# Patient Record
Sex: MALE | Race: WHITE | Employment: UNEMPLOYED | URBAN - METROPOLITAN AREA
[De-identification: names, ages, dates, MRNs, and addresses within clinical notes are randomized per-mention and may not be internally consistent; named-entity substitution may affect disease eponyms.]

---

## 2019-03-18 RX ORDER — OMEPRAZOLE 40 MG/1
40 CAPSULE, DELAYED RELEASE ORAL DAILY
COMMUNITY

## 2019-03-18 RX ORDER — HYDROCODONE BITARTRATE AND ACETAMINOPHEN 5; 325 MG/1; MG/1
1 TABLET ORAL
Status: ON HOLD | COMMUNITY
End: 2019-03-22 | Stop reason: SDUPTHER

## 2019-03-18 RX ORDER — ATORVASTATIN CALCIUM 40 MG/1
TABLET, FILM COATED ORAL DAILY
COMMUNITY

## 2019-03-18 RX ORDER — MELATONIN 5 MG
5 CAPSULE ORAL
COMMUNITY

## 2019-03-18 RX ORDER — CARBAMAZEPINE 400 MG/1
400 TABLET, EXTENDED RELEASE ORAL 2 TIMES DAILY
COMMUNITY

## 2019-03-18 RX ORDER — FLUOXETINE HYDROCHLORIDE 40 MG/1
40 CAPSULE ORAL DAILY
COMMUNITY

## 2019-03-18 RX ORDER — CARBAMAZEPINE 400 MG/1
400 TABLET, EXTENDED RELEASE ORAL 2 TIMES DAILY
COMMUNITY
End: 2021-09-15 | Stop reason: SDUPTHER

## 2019-03-18 RX ORDER — CALCIUM POLYCARBOPHIL 625 MG
625 TABLET ORAL EVERY EVENING
COMMUNITY

## 2019-03-18 RX ORDER — CETIRIZINE HCL 10 MG
TABLET ORAL DAILY
COMMUNITY

## 2019-03-18 RX ORDER — MULTIVIT WITH IRON,MINERALS
2 TABLET ORAL 2 TIMES DAILY
COMMUNITY
End: 2021-09-15

## 2019-03-18 NOTE — PERIOP NOTES
PAT PREOP PHONE INTERVIEW COMPLETED WITH: RYLEE HOOVER, BIRTHMOTHER. MEDICATIONS AND PREOP INSTRUCTIONS REVIEWED AND FAXED TO VEGA FENG, CAREGIVER AT [facility]. PATIENT'S PARENTS WILL BE PRESENT DOS FOR SIGNING OF CONSENT. CAREGIVER ADVISED: PATIENT NOT TO EAT/DRINK ANYTHING PAST MIDNIGHT EVENING PRIOR TO SURGERY,  NOTHING TO EAT/DRINK MORNING OF SURGERY UNLESS SIP OF WATER TO SWALLOW MEDICATION;  LEAVE ALL VALUABLES AT HOME; DO BRING PICTURE ID, INSURANCE CARD AND ANY COPAY; WEAR COMFORTABLE CLOTHING;  NO PERFUMES, POWDERS, LOTIONS; NO ALCOHOL 24 HOURS BEFORE OR AFTER SURGERY;  WILL NEED TO BE DRIVEN HOME BY FAMILY OR FRIEND;  AVOID TAKING NSAIDS, ASPIRIN, FISH OIL, VITAMIN E OR GLUCOSAMINE/CHONDROITIN DURING THIS TIME PRIOR TO SURGERY;  MAY TAKE TYLENOL. INSTRUCTED TO REPORT TO [address]

## 2019-03-20 ENCOUNTER — ANESTHESIA EVENT (OUTPATIENT)
Dept: SURGERY | Age: 39
End: 2019-03-20
Payer: MEDICARE

## 2019-03-21 ENCOUNTER — HOSPITAL ENCOUNTER (OUTPATIENT)
Age: 39
Setting detail: OBSERVATION
Discharge: HOME | End: 2019-03-22
Attending: ORTHOPAEDIC SURGERY | Admitting: ORTHOPAEDIC SURGERY
Payer: MEDICARE

## 2019-03-21 ENCOUNTER — ANESTHESIA (OUTPATIENT)
Dept: SURGERY | Age: 39
End: 2019-03-21
Payer: MEDICARE

## 2019-03-21 ENCOUNTER — APPOINTMENT (OUTPATIENT)
Dept: GENERAL RADIOLOGY | Age: 39
End: 2019-03-21
Attending: ORTHOPAEDIC SURGERY
Payer: MEDICARE

## 2019-03-21 DIAGNOSIS — S42.022D CLOSED DISPLACED FRACTURE OF SHAFT OF LEFT CLAVICLE WITH ROUTINE HEALING, SUBSEQUENT ENCOUNTER: Primary | ICD-10-CM

## 2019-03-21 PROBLEM — S42.002A CLOSED DISPLACED FRACTURE OF LEFT CLAVICLE: Status: ACTIVE | Noted: 2019-03-21

## 2019-03-21 PROBLEM — Z87.81: Status: ACTIVE | Noted: 2019-03-21

## 2019-03-21 PROBLEM — F84.0 AUTISM SPECTRUM DISORDER: Status: ACTIVE | Noted: 2019-03-21

## 2019-03-21 LAB
ALBUMIN SERPL-MCNC: 4.1 G/DL (ref 3.5–5)
ALBUMIN/GLOB SERPL: 1 {RATIO} (ref 1.1–2.2)
ALP SERPL-CCNC: 286 U/L (ref 45–117)
ALT SERPL-CCNC: 46 U/L (ref 12–78)
ANION GAP SERPL CALC-SCNC: 6 MMOL/L (ref 5–15)
AST SERPL-CCNC: 28 U/L (ref 15–37)
BASOPHILS # BLD: 0 K/UL (ref 0–0.1)
BASOPHILS NFR BLD: 0 % (ref 0–1)
BILIRUB SERPL-MCNC: 0.3 MG/DL (ref 0.2–1)
BUN SERPL-MCNC: 15 MG/DL (ref 6–20)
BUN/CREAT SERPL: 20 (ref 12–20)
CALCIUM SERPL-MCNC: 9.2 MG/DL (ref 8.5–10.1)
CARBAMAZEPINE SERPL-MCNC: 10.8 UG/ML (ref 4–12)
CHLORIDE SERPL-SCNC: 104 MMOL/L (ref 97–108)
CHOLEST SERPL-MCNC: 181 MG/DL
CO2 SERPL-SCNC: 27 MMOL/L (ref 21–32)
CREAT SERPL-MCNC: 0.74 MG/DL (ref 0.7–1.3)
DIFFERENTIAL METHOD BLD: ABNORMAL
EOSINOPHIL # BLD: 0.1 K/UL (ref 0–0.4)
EOSINOPHIL NFR BLD: 2 % (ref 0–7)
ERYTHROCYTE [DISTWIDTH] IN BLOOD BY AUTOMATED COUNT: 11.9 % (ref 11.5–14.5)
GLOBULIN SER CALC-MCNC: 4 G/DL (ref 2–4)
GLUCOSE SERPL-MCNC: 105 MG/DL (ref 65–100)
HCT VFR BLD AUTO: 41.3 % (ref 36.6–50.3)
HDLC SERPL-MCNC: 49 MG/DL
HDLC SERPL: 3.7 {RATIO} (ref 0–5)
HGB BLD-MCNC: 14.4 G/DL (ref 12.1–17)
IMM GRANULOCYTES # BLD AUTO: 0 K/UL (ref 0–0.04)
IMM GRANULOCYTES NFR BLD AUTO: 0 % (ref 0–0.5)
LDLC SERPL CALC-MCNC: 108.6 MG/DL (ref 0–100)
LIPID PROFILE,FLP: ABNORMAL
LYMPHOCYTES # BLD: 1.4 K/UL (ref 0.8–3.5)
LYMPHOCYTES NFR BLD: 18 % (ref 12–49)
MCH RBC QN AUTO: 33.3 PG (ref 26–34)
MCHC RBC AUTO-ENTMCNC: 34.9 G/DL (ref 30–36.5)
MCV RBC AUTO: 95.4 FL (ref 80–99)
MONOCYTES # BLD: 0.7 K/UL (ref 0–1)
MONOCYTES NFR BLD: 9 % (ref 5–13)
NEUTS SEG # BLD: 5.7 K/UL (ref 1.8–8)
NEUTS SEG NFR BLD: 71 % (ref 32–75)
NRBC # BLD: 0 K/UL (ref 0–0.01)
NRBC BLD-RTO: 0 PER 100 WBC
PLATELET # BLD AUTO: 428 K/UL (ref 150–400)
PMV BLD AUTO: 8.7 FL (ref 8.9–12.9)
POTASSIUM SERPL-SCNC: 3.6 MMOL/L (ref 3.5–5.1)
PROT SERPL-MCNC: 8.1 G/DL (ref 6.4–8.2)
RBC # BLD AUTO: 4.33 M/UL (ref 4.1–5.7)
SODIUM SERPL-SCNC: 137 MMOL/L (ref 136–145)
TRIGL SERPL-MCNC: 117 MG/DL (ref ?–150)
VLDLC SERPL CALC-MCNC: 23.4 MG/DL
WBC # BLD AUTO: 7.9 K/UL (ref 4.1–11.1)

## 2019-03-21 PROCEDURE — 77030011640 HC PAD GRND REM COVD -A: Performed by: ORTHOPAEDIC SURGERY

## 2019-03-21 PROCEDURE — C1713 ANCHOR/SCREW BN/BN,TIS/BN: HCPCS | Performed by: ORTHOPAEDIC SURGERY

## 2019-03-21 PROCEDURE — 73000 X-RAY EXAM OF COLLAR BONE: CPT

## 2019-03-21 PROCEDURE — 77030008684 HC TU ET CUF COVD -B: Performed by: ANESTHESIOLOGY

## 2019-03-21 PROCEDURE — 74011250636 HC RX REV CODE- 250/636: Performed by: ORTHOPAEDIC SURGERY

## 2019-03-21 PROCEDURE — 77030033269 HC SLV COMPR SCD KNE2 CARD -B

## 2019-03-21 PROCEDURE — 76210000006 HC OR PH I REC 0.5 TO 1 HR: Performed by: ORTHOPAEDIC SURGERY

## 2019-03-21 PROCEDURE — 76010000153 HC OR TIME 1.5 TO 2 HR: Performed by: ORTHOPAEDIC SURGERY

## 2019-03-21 PROCEDURE — 74011250637 HC RX REV CODE- 250/637: Performed by: ORTHOPAEDIC SURGERY

## 2019-03-21 PROCEDURE — 77030018836 HC SOL IRR NACL ICUM -A: Performed by: ORTHOPAEDIC SURGERY

## 2019-03-21 PROCEDURE — 77030031139 HC SUT VCRL2 J&J -A: Performed by: ORTHOPAEDIC SURGERY

## 2019-03-21 PROCEDURE — 74011000250 HC RX REV CODE- 250

## 2019-03-21 PROCEDURE — 74011000250 HC RX REV CODE- 250: Performed by: ORTHOPAEDIC SURGERY

## 2019-03-21 PROCEDURE — 74011250636 HC RX REV CODE- 250/636: Performed by: ANESTHESIOLOGY

## 2019-03-21 PROCEDURE — 77030019908 HC STETH ESOPH SIMS -A: Performed by: ANESTHESIOLOGY

## 2019-03-21 PROCEDURE — 85025 COMPLETE CBC W/AUTO DIFF WBC: CPT

## 2019-03-21 PROCEDURE — 76060000035 HC ANESTHESIA 2 TO 2.5 HR: Performed by: ORTHOPAEDIC SURGERY

## 2019-03-21 PROCEDURE — 77030013079 HC BLNKT BAIR HGGR 3M -A: Performed by: ANESTHESIOLOGY

## 2019-03-21 PROCEDURE — 74011250636 HC RX REV CODE- 250/636

## 2019-03-21 PROCEDURE — 80061 LIPID PANEL: CPT

## 2019-03-21 PROCEDURE — 76000 FLUOROSCOPY <1 HR PHYS/QHP: CPT

## 2019-03-21 PROCEDURE — 36415 COLL VENOUS BLD VENIPUNCTURE: CPT

## 2019-03-21 PROCEDURE — 99218 HC RM OBSERVATION: CPT

## 2019-03-21 PROCEDURE — 77030026438 HC STYL ET INTUB CARD -A: Performed by: ANESTHESIOLOGY

## 2019-03-21 PROCEDURE — 80053 COMPREHEN METABOLIC PANEL: CPT

## 2019-03-21 PROCEDURE — 80156 ASSAY CARBAMAZEPINE TOTAL: CPT

## 2019-03-21 PROCEDURE — 77030003737 HC BIT DRL ACMD -C: Performed by: ORTHOPAEDIC SURGERY

## 2019-03-21 DEVICE — 3.5MM X 14MM NON-LOCKING HEXALOBE SCREW
Type: IMPLANTABLE DEVICE | Site: CLAVICLE | Status: FUNCTIONAL
Brand: ACUMED

## 2019-03-21 DEVICE — 3.5MM X 12MM LOCKING HEXALOBE SCREW
Type: IMPLANTABLE DEVICE | Site: CLAVICLE | Status: FUNCTIONAL
Brand: ACUMED

## 2019-03-21 DEVICE — 3.5MM X 18MM NON-LOCKING HEXALOBE SCREW
Type: IMPLANTABLE DEVICE | Site: CLAVICLE | Status: FUNCTIONAL
Brand: ACUMED

## 2019-03-21 DEVICE — 3.5MM X 10MM NON-LOCKING HEXALOBE SCREW
Type: IMPLANTABLE DEVICE | Site: CLAVICLE | Status: FUNCTIONAL
Brand: ACUMED

## 2019-03-21 RX ORDER — MORPHINE SULFATE 4 MG/ML
INJECTION, SOLUTION INTRAMUSCULAR; INTRAVENOUS AS NEEDED
Status: DISCONTINUED | OUTPATIENT
Start: 2019-03-21 | End: 2019-03-21 | Stop reason: HOSPADM

## 2019-03-21 RX ORDER — MIDAZOLAM HYDROCHLORIDE 1 MG/ML
INJECTION, SOLUTION INTRAMUSCULAR; INTRAVENOUS
Status: COMPLETED
Start: 2019-03-21 | End: 2019-03-21

## 2019-03-21 RX ORDER — OMEPRAZOLE 40 MG/1
40 CAPSULE, DELAYED RELEASE ORAL DAILY
Status: DISCONTINUED | OUTPATIENT
Start: 2019-03-22 | End: 2019-03-21 | Stop reason: CLARIF

## 2019-03-21 RX ORDER — CARBAMAZEPINE 200 MG/1
400 TABLET, EXTENDED RELEASE ORAL 2 TIMES DAILY
Status: DISCONTINUED | OUTPATIENT
Start: 2019-03-21 | End: 2019-03-22 | Stop reason: HOSPADM

## 2019-03-21 RX ORDER — BUPIVACAINE HYDROCHLORIDE AND EPINEPHRINE 5; 5 MG/ML; UG/ML
INJECTION, SOLUTION EPIDURAL; INTRACAUDAL; PERINEURAL AS NEEDED
Status: DISCONTINUED | OUTPATIENT
Start: 2019-03-21 | End: 2019-03-21 | Stop reason: HOSPADM

## 2019-03-21 RX ORDER — DEXTROSE, SODIUM CHLORIDE, AND POTASSIUM CHLORIDE 5; .45; .15 G/100ML; G/100ML; G/100ML
INJECTION INTRAVENOUS
Status: DISCONTINUED
Start: 2019-03-21 | End: 2019-03-21

## 2019-03-21 RX ORDER — SODIUM CHLORIDE, SODIUM LACTATE, POTASSIUM CHLORIDE, CALCIUM CHLORIDE 600; 310; 30; 20 MG/100ML; MG/100ML; MG/100ML; MG/100ML
125 INJECTION, SOLUTION INTRAVENOUS CONTINUOUS
Status: DISCONTINUED | OUTPATIENT
Start: 2019-03-21 | End: 2019-03-21 | Stop reason: HOSPADM

## 2019-03-21 RX ORDER — ONDANSETRON 2 MG/ML
INJECTION INTRAMUSCULAR; INTRAVENOUS AS NEEDED
Status: DISCONTINUED | OUTPATIENT
Start: 2019-03-21 | End: 2019-03-21 | Stop reason: HOSPADM

## 2019-03-21 RX ORDER — LANOLIN ALCOHOL/MO/W.PET/CERES
6 CREAM (GRAM) TOPICAL
Status: DISCONTINUED | OUTPATIENT
Start: 2019-03-21 | End: 2019-03-22 | Stop reason: HOSPADM

## 2019-03-21 RX ORDER — ACETAMINOPHEN 10 MG/ML
INJECTION, SOLUTION INTRAVENOUS AS NEEDED
Status: DISCONTINUED | OUTPATIENT
Start: 2019-03-21 | End: 2019-03-21 | Stop reason: HOSPADM

## 2019-03-21 RX ORDER — CALCIUM POLYCARBOPHIL 625 MG
1 TABLET ORAL EVERY EVENING
Status: DISCONTINUED | OUTPATIENT
Start: 2019-03-21 | End: 2019-03-22 | Stop reason: HOSPADM

## 2019-03-21 RX ORDER — FENTANYL CITRATE 50 UG/ML
50 INJECTION, SOLUTION INTRAMUSCULAR; INTRAVENOUS AS NEEDED
Status: DISCONTINUED | OUTPATIENT
Start: 2019-03-21 | End: 2019-03-21 | Stop reason: HOSPADM

## 2019-03-21 RX ORDER — HYDROMORPHONE HYDROCHLORIDE 1 MG/ML
0.2 INJECTION, SOLUTION INTRAMUSCULAR; INTRAVENOUS; SUBCUTANEOUS
Status: DISCONTINUED | OUTPATIENT
Start: 2019-03-21 | End: 2019-03-21 | Stop reason: HOSPADM

## 2019-03-21 RX ORDER — PROPOFOL 10 MG/ML
INJECTION, EMULSION INTRAVENOUS AS NEEDED
Status: DISCONTINUED | OUTPATIENT
Start: 2019-03-21 | End: 2019-03-21 | Stop reason: HOSPADM

## 2019-03-21 RX ORDER — CEFAZOLIN SODIUM 1 G/3ML
INJECTION, POWDER, FOR SOLUTION INTRAMUSCULAR; INTRAVENOUS AS NEEDED
Status: DISCONTINUED | OUTPATIENT
Start: 2019-03-21 | End: 2019-03-21 | Stop reason: HOSPADM

## 2019-03-21 RX ORDER — LIDOCAINE HYDROCHLORIDE 10 MG/ML
0.1 INJECTION, SOLUTION EPIDURAL; INFILTRATION; INTRACAUDAL; PERINEURAL AS NEEDED
Status: DISCONTINUED | OUTPATIENT
Start: 2019-03-21 | End: 2019-03-21 | Stop reason: HOSPADM

## 2019-03-21 RX ORDER — SODIUM CHLORIDE 0.9 % (FLUSH) 0.9 %
5-40 SYRINGE (ML) INJECTION EVERY 8 HOURS
Status: DISCONTINUED | OUTPATIENT
Start: 2019-03-21 | End: 2019-03-21 | Stop reason: HOSPADM

## 2019-03-21 RX ORDER — ONDANSETRON 2 MG/ML
4 INJECTION INTRAMUSCULAR; INTRAVENOUS AS NEEDED
Status: DISCONTINUED | OUTPATIENT
Start: 2019-03-21 | End: 2019-03-21 | Stop reason: HOSPADM

## 2019-03-21 RX ORDER — MORPHINE SULFATE 10 MG/ML
2 INJECTION, SOLUTION INTRAMUSCULAR; INTRAVENOUS
Status: DISCONTINUED | OUTPATIENT
Start: 2019-03-21 | End: 2019-03-21 | Stop reason: HOSPADM

## 2019-03-21 RX ORDER — DIPHENHYDRAMINE HYDROCHLORIDE 50 MG/ML
12.5 INJECTION, SOLUTION INTRAMUSCULAR; INTRAVENOUS AS NEEDED
Status: DISCONTINUED | OUTPATIENT
Start: 2019-03-21 | End: 2019-03-21 | Stop reason: HOSPADM

## 2019-03-21 RX ORDER — LORAZEPAM 2 MG/ML
1 INJECTION INTRAMUSCULAR
Status: COMPLETED | OUTPATIENT
Start: 2019-03-21 | End: 2019-03-22

## 2019-03-21 RX ORDER — MIDAZOLAM HYDROCHLORIDE 1 MG/ML
1 INJECTION, SOLUTION INTRAMUSCULAR; INTRAVENOUS ONCE
Status: COMPLETED | OUTPATIENT
Start: 2019-03-21 | End: 2019-03-21

## 2019-03-21 RX ORDER — LIDOCAINE HYDROCHLORIDE 20 MG/ML
INJECTION, SOLUTION EPIDURAL; INFILTRATION; INTRACAUDAL; PERINEURAL AS NEEDED
Status: DISCONTINUED | OUTPATIENT
Start: 2019-03-21 | End: 2019-03-21 | Stop reason: HOSPADM

## 2019-03-21 RX ORDER — HYDROCODONE BITARTRATE AND ACETAMINOPHEN 5; 325 MG/1; MG/1
1 TABLET ORAL
Status: DISCONTINUED | OUTPATIENT
Start: 2019-03-21 | End: 2019-03-22

## 2019-03-21 RX ORDER — DEXMEDETOMIDINE HYDROCHLORIDE 100 UG/ML
INJECTION, SOLUTION INTRAVENOUS AS NEEDED
Status: DISCONTINUED | OUTPATIENT
Start: 2019-03-21 | End: 2019-03-21

## 2019-03-21 RX ORDER — ATORVASTATIN CALCIUM 10 MG/1
5 TABLET, FILM COATED ORAL DAILY
Status: DISCONTINUED | OUTPATIENT
Start: 2019-03-22 | End: 2019-03-22 | Stop reason: HOSPADM

## 2019-03-21 RX ORDER — MIDAZOLAM HYDROCHLORIDE 1 MG/ML
0.5 INJECTION, SOLUTION INTRAMUSCULAR; INTRAVENOUS
Status: COMPLETED | OUTPATIENT
Start: 2019-03-21 | End: 2019-03-21

## 2019-03-21 RX ORDER — SODIUM CHLORIDE, SODIUM LACTATE, POTASSIUM CHLORIDE, CALCIUM CHLORIDE 600; 310; 30; 20 MG/100ML; MG/100ML; MG/100ML; MG/100ML
INJECTION, SOLUTION INTRAVENOUS
Status: DISCONTINUED | OUTPATIENT
Start: 2019-03-21 | End: 2019-03-21 | Stop reason: HOSPADM

## 2019-03-21 RX ORDER — ONDANSETRON 2 MG/ML
4 INJECTION INTRAMUSCULAR; INTRAVENOUS
Status: DISCONTINUED | OUTPATIENT
Start: 2019-03-21 | End: 2019-03-22 | Stop reason: HOSPADM

## 2019-03-21 RX ORDER — CARBAMAZEPINE 100 MG/1
100 TABLET, EXTENDED RELEASE ORAL 2 TIMES DAILY
Status: DISCONTINUED | OUTPATIENT
Start: 2019-03-21 | End: 2019-03-22 | Stop reason: HOSPADM

## 2019-03-21 RX ORDER — DEXTROSE, SODIUM CHLORIDE, AND POTASSIUM CHLORIDE 5; .45; .15 G/100ML; G/100ML; G/100ML
50 INJECTION INTRAVENOUS CONTINUOUS
Status: DISPENSED | OUTPATIENT
Start: 2019-03-21 | End: 2019-03-22

## 2019-03-21 RX ORDER — GLYCOPYRROLATE 0.2 MG/ML
INJECTION INTRAMUSCULAR; INTRAVENOUS AS NEEDED
Status: DISCONTINUED | OUTPATIENT
Start: 2019-03-21 | End: 2019-03-21 | Stop reason: HOSPADM

## 2019-03-21 RX ORDER — SODIUM CHLORIDE 0.9 % (FLUSH) 0.9 %
5-40 SYRINGE (ML) INJECTION EVERY 8 HOURS
Status: DISCONTINUED | OUTPATIENT
Start: 2019-03-21 | End: 2019-03-22 | Stop reason: HOSPADM

## 2019-03-21 RX ORDER — PHENYLEPHRINE HCL IN 0.9% NACL 0.4MG/10ML
SYRINGE (ML) INTRAVENOUS AS NEEDED
Status: DISCONTINUED | OUTPATIENT
Start: 2019-03-21 | End: 2019-03-21 | Stop reason: HOSPADM

## 2019-03-21 RX ORDER — SODIUM CHLORIDE 9 MG/ML
25 INJECTION, SOLUTION INTRAVENOUS CONTINUOUS
Status: DISCONTINUED | OUTPATIENT
Start: 2019-03-21 | End: 2019-03-21 | Stop reason: HOSPADM

## 2019-03-21 RX ORDER — SUCCINYLCHOLINE CHLORIDE 20 MG/ML
INJECTION INTRAMUSCULAR; INTRAVENOUS AS NEEDED
Status: DISCONTINUED | OUTPATIENT
Start: 2019-03-21 | End: 2019-03-21 | Stop reason: HOSPADM

## 2019-03-21 RX ORDER — NEOMYCIN SULFATE, POLYMYXIN B SULFATE, BACITRACIN ZINC, HYDROCORTISONE 3.5; 10000; 400; 1 MG/G; [USP'U]/G; [USP'U]/G; MG/G
OINTMENT OPHTHALMIC 2 TIMES DAILY
Status: DISCONTINUED | OUTPATIENT
Start: 2019-03-21 | End: 2019-03-22 | Stop reason: HOSPADM

## 2019-03-21 RX ORDER — FENTANYL CITRATE 50 UG/ML
25 INJECTION, SOLUTION INTRAMUSCULAR; INTRAVENOUS
Status: COMPLETED | OUTPATIENT
Start: 2019-03-21 | End: 2019-03-21

## 2019-03-21 RX ORDER — DEXAMETHASONE SODIUM PHOSPHATE 4 MG/ML
INJECTION, SOLUTION INTRA-ARTICULAR; INTRALESIONAL; INTRAMUSCULAR; INTRAVENOUS; SOFT TISSUE AS NEEDED
Status: DISCONTINUED | OUTPATIENT
Start: 2019-03-21 | End: 2019-03-21 | Stop reason: HOSPADM

## 2019-03-21 RX ORDER — SODIUM CHLORIDE 0.9 % (FLUSH) 0.9 %
5-40 SYRINGE (ML) INJECTION AS NEEDED
Status: DISCONTINUED | OUTPATIENT
Start: 2019-03-21 | End: 2019-03-22 | Stop reason: HOSPADM

## 2019-03-21 RX ORDER — CEFAZOLIN SODIUM/WATER 2 G/20 ML
2 SYRINGE (ML) INTRAVENOUS EVERY 8 HOURS
Status: COMPLETED | OUTPATIENT
Start: 2019-03-21 | End: 2019-03-22

## 2019-03-21 RX ORDER — PANTOPRAZOLE SODIUM 40 MG/1
40 TABLET, DELAYED RELEASE ORAL
Status: DISCONTINUED | OUTPATIENT
Start: 2019-03-22 | End: 2019-03-22 | Stop reason: HOSPADM

## 2019-03-21 RX ORDER — OXYCODONE AND ACETAMINOPHEN 5; 325 MG/1; MG/1
1 TABLET ORAL AS NEEDED
Status: DISCONTINUED | OUTPATIENT
Start: 2019-03-21 | End: 2019-03-21 | Stop reason: HOSPADM

## 2019-03-21 RX ORDER — DEXMEDETOMIDINE HYDROCHLORIDE 4 UG/ML
INJECTION, SOLUTION INTRAVENOUS AS NEEDED
Status: DISCONTINUED | OUTPATIENT
Start: 2019-03-21 | End: 2019-03-21 | Stop reason: HOSPADM

## 2019-03-21 RX ORDER — SODIUM CHLORIDE 0.9 % (FLUSH) 0.9 %
5-40 SYRINGE (ML) INJECTION AS NEEDED
Status: DISCONTINUED | OUTPATIENT
Start: 2019-03-21 | End: 2019-03-21 | Stop reason: HOSPADM

## 2019-03-21 RX ORDER — KETAMINE HYDROCHLORIDE 10 MG/ML
INJECTION, SOLUTION INTRAMUSCULAR; INTRAVENOUS AS NEEDED
Status: DISCONTINUED | OUTPATIENT
Start: 2019-03-21 | End: 2019-03-21 | Stop reason: HOSPADM

## 2019-03-21 RX ORDER — ROCURONIUM BROMIDE 10 MG/ML
INJECTION, SOLUTION INTRAVENOUS AS NEEDED
Status: DISCONTINUED | OUTPATIENT
Start: 2019-03-21 | End: 2019-03-21 | Stop reason: HOSPADM

## 2019-03-21 RX ORDER — MIDAZOLAM HYDROCHLORIDE 1 MG/ML
1 INJECTION, SOLUTION INTRAMUSCULAR; INTRAVENOUS AS NEEDED
Status: DISCONTINUED | OUTPATIENT
Start: 2019-03-21 | End: 2019-03-21 | Stop reason: HOSPADM

## 2019-03-21 RX ADMIN — Medication 6 MG: at 22:34

## 2019-03-21 RX ADMIN — DEXMEDETOMIDINE HYDROCHLORIDE 4 MCG: 4 INJECTION, SOLUTION INTRAVENOUS at 09:10

## 2019-03-21 RX ADMIN — SODIUM CHLORIDE, SODIUM LACTATE, POTASSIUM CHLORIDE, CALCIUM CHLORIDE: 600; 310; 30; 20 INJECTION, SOLUTION INTRAVENOUS at 08:40

## 2019-03-21 RX ADMIN — DEXMEDETOMIDINE HYDROCHLORIDE 4 MCG: 4 INJECTION, SOLUTION INTRAVENOUS at 08:40

## 2019-03-21 RX ADMIN — SUCCINYLCHOLINE CHLORIDE 100 MG: 20 INJECTION INTRAMUSCULAR; INTRAVENOUS at 08:40

## 2019-03-21 RX ADMIN — MIDAZOLAM 0.5 MG: 1 INJECTION INTRAMUSCULAR; INTRAVENOUS at 10:45

## 2019-03-21 RX ADMIN — FENTANYL CITRATE 25 MCG: 50 INJECTION INTRAMUSCULAR; INTRAVENOUS at 10:55

## 2019-03-21 RX ADMIN — DEXMEDETOMIDINE HYDROCHLORIDE 4 MCG: 4 INJECTION, SOLUTION INTRAVENOUS at 09:04

## 2019-03-21 RX ADMIN — FENTANYL CITRATE 25 MCG: 50 INJECTION INTRAMUSCULAR; INTRAVENOUS at 10:40

## 2019-03-21 RX ADMIN — NEOMYCIN SULFATE AND POLYMYXIN B SULFATE, BACITRACIN ZINC AND HYDROCORTISONE: 3.5; 10000; 400; 1 OINTMENT OPHTHALMIC at 18:31

## 2019-03-21 RX ADMIN — MIDAZOLAM HYDROCHLORIDE 0.5 MG: 1 INJECTION, SOLUTION INTRAMUSCULAR; INTRAVENOUS at 12:15

## 2019-03-21 RX ADMIN — HYDROCODONE BITARTRATE AND ACETAMINOPHEN 1 TABLET: 5; 325 TABLET ORAL at 19:08

## 2019-03-21 RX ADMIN — FENTANYL CITRATE 25 MCG: 50 INJECTION INTRAMUSCULAR; INTRAVENOUS at 10:30

## 2019-03-21 RX ADMIN — MORPHINE SULFATE 2 MG: 4 INJECTION, SOLUTION INTRAMUSCULAR; INTRAVENOUS at 09:16

## 2019-03-21 RX ADMIN — FENTANYL CITRATE 25 MCG: 50 INJECTION INTRAMUSCULAR; INTRAVENOUS at 10:50

## 2019-03-21 RX ADMIN — DEXMEDETOMIDINE HYDROCHLORIDE 4 MCG: 4 INJECTION, SOLUTION INTRAVENOUS at 09:19

## 2019-03-21 RX ADMIN — ACETAMINOPHEN 1000 MG: 10 INJECTION, SOLUTION INTRAVENOUS at 09:33

## 2019-03-21 RX ADMIN — ROCURONIUM BROMIDE 5 MG: 10 INJECTION, SOLUTION INTRAVENOUS at 08:40

## 2019-03-21 RX ADMIN — PROPOFOL 80 MG: 10 INJECTION, EMULSION INTRAVENOUS at 08:40

## 2019-03-21 RX ADMIN — CALCIUM POLYCARBOPHIL 625 MG: 625 TABLET, FILM COATED ORAL at 18:28

## 2019-03-21 RX ADMIN — MIDAZOLAM 0.5 MG: 1 INJECTION INTRAMUSCULAR; INTRAVENOUS at 09:29

## 2019-03-21 RX ADMIN — LIDOCAINE HYDROCHLORIDE 40 MG: 20 INJECTION, SOLUTION EPIDURAL; INFILTRATION; INTRACAUDAL; PERINEURAL at 08:40

## 2019-03-21 RX ADMIN — GLYCOPYRROLATE 0.2 MG: 0.2 INJECTION INTRAMUSCULAR; INTRAVENOUS at 08:37

## 2019-03-21 RX ADMIN — MIDAZOLAM 0.5 MG: 1 INJECTION INTRAMUSCULAR; INTRAVENOUS at 10:55

## 2019-03-21 RX ADMIN — CARBAMAZEPINE 100 MG: 100 TABLET, EXTENDED RELEASE ORAL at 18:29

## 2019-03-21 RX ADMIN — CEFAZOLIN SODIUM 2 G: 1 INJECTION, POWDER, FOR SOLUTION INTRAMUSCULAR; INTRAVENOUS at 09:06

## 2019-03-21 RX ADMIN — ONDANSETRON 4 MG: 2 INJECTION INTRAMUSCULAR; INTRAVENOUS at 09:06

## 2019-03-21 RX ADMIN — Medication 2 G: at 18:31

## 2019-03-21 RX ADMIN — MORPHINE SULFATE 2 MG: 4 INJECTION, SOLUTION INTRAMUSCULAR; INTRAVENOUS at 10:26

## 2019-03-21 RX ADMIN — MIDAZOLAM HYDROCHLORIDE 0.5 MG: 1 INJECTION, SOLUTION INTRAMUSCULAR; INTRAVENOUS at 11:55

## 2019-03-21 RX ADMIN — CARBAMAZEPINE 400 MG: 200 TABLET, EXTENDED RELEASE ORAL at 18:29

## 2019-03-21 RX ADMIN — DEXAMETHASONE SODIUM PHOSPHATE 4 MG: 4 INJECTION, SOLUTION INTRA-ARTICULAR; INTRALESIONAL; INTRAMUSCULAR; INTRAVENOUS; SOFT TISSUE at 09:06

## 2019-03-21 RX ADMIN — MIDAZOLAM 0.5 MG: 1 INJECTION INTRAMUSCULAR; INTRAVENOUS at 11:55

## 2019-03-21 RX ADMIN — MIDAZOLAM 0.5 MG: 1 INJECTION INTRAMUSCULAR; INTRAVENOUS at 11:10

## 2019-03-21 RX ADMIN — KETAMINE HYDROCHLORIDE 200 MG: 10 INJECTION, SOLUTION INTRAMUSCULAR; INTRAVENOUS at 08:19

## 2019-03-21 RX ADMIN — Medication 80 MCG: at 09:01

## 2019-03-21 NOTE — PERIOP NOTES
Patient non verbal, pulling on lines and will not keep Oxygen on. Family requesting to have SCD taken off left leg as patient is getting agitated from SCD. Mothers says, he does not like anything touching him. Patient rocking back and force in bed.   Moaning on and off

## 2019-03-21 NOTE — PROGRESS NOTES
Bedside shift change report given to hodan (oncoming nurse) by [facility] (offgoing nurse).  Report included the following information SBAR, Kardex, OR Summary, Intake/Output, MAR and Recent Results. '

## 2019-03-21 NOTE — ANESTHESIA POSTPROCEDURE EVALUATION
Post-Anesthesia Evaluation and Assessment Patient: Hellen Rowell MRN: [id number]  SSN: [id number] YOB: 1980  Age: 43 y.o. Sex: male I have evaluated the patient and they are stable and ready for discharge from the PACU. Cardiovascular Function/Vital Signs Visit Vitals /76 Pulse 83 Temp 36.8 °C (98.2 °F) Resp 9 Ht 5' 4\" (1.626 m) Wt 62.1 kg (137 lb) SpO2 97% BMI 23.52 kg/m² Patient is status post General anesthesia for Procedure(s): OPEN REDUCTION INTERNAL FIXATION LEFT CLAVICLE FRACTURE. Nausea/Vomiting: None Postoperative hydration reviewed and adequate. Pain: 
Pain Scale 1: FLACC (03/21/19 1100) Managed Neurological Status:  
Neuro (WDL): Exceptions to WDL (03/21/19 1100) Neuro Neurologic State: Drowsy(rocking back and force) (03/21/19 1100) Orientation Level: Unable to verbalize(servere autism, none verbal) (03/21/19 1100) Cognition: Unable to assess (comment) (03/21/19 1100) Speech: (moaning) (03/21/19 1100) LUE Motor Response: Spontaneous  (03/21/19 1100) LLE Motor Response: Spontaneous  (03/21/19 1100) RUE Motor Response: Spontaneous  (03/21/19 1100) RLE Motor Response: Spontaneous  (03/21/19 1100) At baseline Mental Status, Level of Consciousness: Alert and  oriented to person, place, and time Pulmonary Status:  
O2 Device: Room air (03/21/19 1100) Adequate oxygenation and airway patent Complications related to anesthesia: None Post-anesthesia assessment completed. No concerns Signed By: Frankie Moya MD   
 March 21, 2019 Procedure(s): OPEN REDUCTION INTERNAL FIXATION LEFT CLAVICLE FRACTURE. general 
 
<BSHSIANPOST> Vitals Value Taken Time /76 3/21/2019 11:00 AM  
Temp 36.8 °C (98.2 °F) 3/21/2019 11:00 AM  
Pulse 91 3/21/2019 11:13 AM  
Resp 9 3/21/2019 11:13 AM  
SpO2 85 % 3/21/2019 11:13 AM  
Vitals shown include unvalidated device data.

## 2019-03-21 NOTE — H&P
Date of Surgery Update:  Dharmesh Alan was seen and examined. History and physical has been reviewed. The patient has been examined. There have been no significant clinical changes since the completion of the originally dated History and Physical.  Patient identified by surgeon; surgical site was confirmed by patient and surgeon.     Signed By: Priyank George MD     March 21, 2019 8:16 AM

## 2019-03-21 NOTE — ROUTINE PROCESS
Patient: Mauricio Finn MRN: [id number]  SSN: [id number]   YOB: 1980  Age: 43 y.o. Sex: male     Patient is status post Procedure(s):  OPEN REDUCTION INTERNAL FIXATION LEFT CLAVICLE FRACTURE. Surgeon(s) and Role:     * Bladimir Yeager MD - Primary    Local/Dose/Irrigation:  20 ml half marcaine with epi.                            Airway - Endotracheal Tube 03/21/19 Oral (Active)                   Dressing/Packing:  Wound Shoulder Left-Dressing Type : (exofin, 4x4, tape) (03/21/19 0900)  Splint/Cast:  ]    Other:  Cast on right foot

## 2019-03-21 NOTE — ANESTHESIA PREPROCEDURE EVALUATION
Relevant Problems No relevant active problems Anesthetic History No history of anesthetic complications Review of Systems / Medical History Patient summary reviewed, nursing notes reviewed and pertinent labs reviewed Pulmonary Within defined limits Neuro/Psych  
 
seizures Comments: noncommunicative Cardiovascular Within defined limits Exercise tolerance: >4 METS 
  
GI/Hepatic/Renal 
Within defined limits GERD Endo/Other Within defined limits Other Findings Comments: autism Physical Exam 
 
Airway Mallampati: III 
TM Distance: > 6 cm Neck ROM: normal range of motion Mouth opening: Normal 
 
 Cardiovascular Regular rate and rhythm,  S1 and S2 normal,  no murmur, click, rub, or gallop Dental 
No notable dental hx Pulmonary Breath sounds clear to auscultation Abdominal 
GI exam deferred Other Findings Anesthetic Plan ASA: 3 Anesthesia type: general 
 
 
 
 
Induction: Intravenous Anesthetic plan and risks discussed with: Patient Disc IM ket inj w/mother. IM ind is agreed to,.

## 2019-03-21 NOTE — PERIOP NOTES
TRANSFER - OUT REPORT:    Verbal report given to  GERARDO Cordon(name) on Sam Perez  being transferred to 56(unit) for routine post - op       Report consisted of patients Situation, Background, Assessment and   Recommendations(SBAR). Time Pre op antibiotic given:9:06 Ancef  Anesthesia Stop time: 10:21  Rosales Present on Transfer to floor:no  Order for Rosales on Chart:no  Discharge Prescriptions with Chart:no    Information from the following report(s) SBAR, Kardex, OR Summary, Procedure Summary, Intake/Output and MAR was reviewed with the receiving nurse. Opportunity for questions and clarification was provided. Is the patient on 02? NO       L/Min        Other     Is the patient on a monitor? NO    Is the nurse transporting with the patient? NO    Surgical Waiting Area notified of patient's transfer from PACU? YES      The following personal items collected during your admission accompanied patient upon transfer:   Dental Appliance: Dental Appliances:  Other (comment)(upper front implant)  Vision: Visual Aid: None  Hearing Aid:    Jewelry:    Clothing:    Other Valuables:    Valuables sent to safe:

## 2019-03-21 NOTE — PROGRESS NOTES
Primary Nurse Kristin Man RN and Jos Martinez RN performed a dual skin assessment on this patient No impairment noted  Jimi score is 21

## 2019-03-22 VITALS
DIASTOLIC BLOOD PRESSURE: 75 MMHG | OXYGEN SATURATION: 95 % | TEMPERATURE: 97.9 F | RESPIRATION RATE: 18 BRPM | HEART RATE: 103 BPM | BODY MASS INDEX: 23.39 KG/M2 | HEIGHT: 64 IN | WEIGHT: 137 LBS | SYSTOLIC BLOOD PRESSURE: 124 MMHG

## 2019-03-22 PROCEDURE — 74011250637 HC RX REV CODE- 250/637: Performed by: ORTHOPAEDIC SURGERY

## 2019-03-22 PROCEDURE — 97116 GAIT TRAINING THERAPY: CPT

## 2019-03-22 PROCEDURE — 97530 THERAPEUTIC ACTIVITIES: CPT

## 2019-03-22 PROCEDURE — 99218 HC RM OBSERVATION: CPT

## 2019-03-22 PROCEDURE — 97162 PT EVAL MOD COMPLEX 30 MIN: CPT

## 2019-03-22 PROCEDURE — 74011250637 HC RX REV CODE- 250/637: Performed by: STUDENT IN AN ORGANIZED HEALTH CARE EDUCATION/TRAINING PROGRAM

## 2019-03-22 PROCEDURE — 74011250636 HC RX REV CODE- 250/636: Performed by: ORTHOPAEDIC SURGERY

## 2019-03-22 RX ORDER — MELOXICAM 7.5 MG/1
15 TABLET ORAL DAILY
Status: DISCONTINUED | OUTPATIENT
Start: 2019-03-22 | End: 2019-03-22 | Stop reason: HOSPADM

## 2019-03-22 RX ORDER — GABAPENTIN 100 MG/1
100 CAPSULE ORAL 2 TIMES DAILY
Status: DISCONTINUED | OUTPATIENT
Start: 2019-03-22 | End: 2019-03-22 | Stop reason: HOSPADM

## 2019-03-22 RX ORDER — ACETAMINOPHEN 325 MG/1
975 TABLET ORAL EVERY 8 HOURS
Status: DISCONTINUED | OUTPATIENT
Start: 2019-03-22 | End: 2019-03-22 | Stop reason: HOSPADM

## 2019-03-22 RX ORDER — OXYCODONE HYDROCHLORIDE 5 MG/1
5 TABLET ORAL
Status: DISCONTINUED | OUTPATIENT
Start: 2019-03-22 | End: 2019-03-22 | Stop reason: HOSPADM

## 2019-03-22 RX ORDER — HYDROCODONE BITARTRATE AND ACETAMINOPHEN 5; 325 MG/1; MG/1
1 TABLET ORAL
Qty: 50 TAB | Refills: 0 | Status: SHIPPED | OUTPATIENT
Start: 2019-03-22 | End: 2019-03-29

## 2019-03-22 RX ADMIN — PANTOPRAZOLE SODIUM 40 MG: 40 TABLET, DELAYED RELEASE ORAL at 07:14

## 2019-03-22 RX ADMIN — Medication 2 G: at 01:02

## 2019-03-22 RX ADMIN — FLUOXETINE 30 MG: 20 CAPSULE ORAL at 09:45

## 2019-03-22 RX ADMIN — GABAPENTIN 100 MG: 100 CAPSULE ORAL at 07:41

## 2019-03-22 RX ADMIN — LORAZEPAM 1 MG: 2 INJECTION INTRAMUSCULAR; INTRAVENOUS at 01:02

## 2019-03-22 RX ADMIN — ATORVASTATIN CALCIUM 5 MG: 10 TABLET, FILM COATED ORAL at 09:46

## 2019-03-22 RX ADMIN — MELOXICAM 15 MG: 7.5 TABLET ORAL at 07:14

## 2019-03-22 RX ADMIN — CARBAMAZEPINE 400 MG: 200 TABLET, EXTENDED RELEASE ORAL at 09:49

## 2019-03-22 RX ADMIN — CARBAMAZEPINE 100 MG: 100 TABLET, EXTENDED RELEASE ORAL at 09:49

## 2019-03-22 RX ADMIN — ACETAMINOPHEN 975 MG: 325 TABLET ORAL at 07:14

## 2019-03-22 NOTE — PROGRESS NOTES
Bedside and Verbal shift change report given to Maxine Carranza RN (oncoming nurse) by Toni Stiles RN (offgoing nurse). Report included the following information SBAR.

## 2019-03-22 NOTE — PROGRESS NOTES
physical Therapy EVALUATION/DISCHARGE  Patient: Ami Pratt (34 y.o. male)  Date: 3/22/2019  Primary Diagnosis: H/O fracture of clavicle [Z87.81]  Procedure(s) (LRB):  OPEN REDUCTION INTERNAL FIXATION LEFT CLAVICLE FRACTURE (Left) 1 Day Post-Op   Precautions: WBAT RLE, NWB LUE     ASSESSMENT :  Based on the objective data described below, the patient presents with overall decreased mobility, limited by cognition for cooperation. Pt performing stimming behaviors of rocking and grabbing cast/cast padding, occasionally slaps at therapist when attempting to assist. Allowed pt father to perform majority of the mobility. Applied ace wrap on top of cast padding due to pt banging his LLE against cast and causing abrasions/bruises. Pt performed gait to bathroom, getting into/out of bed and transfers with CGA-SBA due to jerking     Further skilled acute physical therapy is not indicated at this time. Pt will benefit from returning to home situation with educated caregivers. PLAN :  Discharge Recommendations: group home with 24hr s  Further Equipment Recommendations for Discharge: cast shoe at all times. SUBJECTIVE:   Patient nonverbal. Pt mother has many questions and large amounts of explaination/reassurance due to high anxiety regarding this situation. Discussed with family and case workers how to manage tasks at home. Pt mother given three gait belts, one for each location he stays at. OBJECTIVE DATA SUMMARY:   HISTORY:    Past Medical History:   Diagnosis Date    GERD (gastroesophageal reflux disease)     Hyperlipidemia     Incontinence     URINE, STOOL; WEARS DEPENDS.     Neurological disorder     autism; NONVERBAL    PUD (peptic ulcer disease)     Seizures ([address]     CONTROLLED WITH MEDS;  LAST SZ:  EARLY TEENS    Stress fracture     RIGHT HEEL; WEARS BOOT     Past Surgical History:   Procedure Laterality Date    HX ENDOSCOPY      HX ORTHOPAEDIC Right 1992    collar bone surgery, METAL  (DR SHARPS) Prior Level of Function/Home Situation: pt was independent with gait/mobility and total assist for ADLs  Personal factors and/or comorbidities impacting plan of care: significant autism, behaviors. Home Situation  [facility]  # Steps to Enter: 0  One/Two Story Residence: One story  Living Alone: No  Support Systems: /, Other (comments)  Patient Expects to be Discharged to[de-identified] Group home  Current DME Used/Available at Home: None    EXAMINATION/PRESENTATION/DECISION MAKING:   Critical Behavior:  Neurologic State: Alert  Orientation Level: Unable to verbalize  Cognition: Decreased attention/concentration, Decreased command following, Impaired decision making, Impulsive, Poor safety awareness     Hearing: Auditory  Auditory Impairment: None  Hearing Aids/Status: Does not own  Skin:    Range Of Motion:  AROM: Generally decreased, functional           PROM: Generally decreased, functional           Strength:    Strength: Within functional limits                    Tone & Sensation:   Tone: Abnormal              Sensation: Intact               Coordination:  Coordination: Grossly decreased, non-functional  Vision:      Functional Mobility:  Bed Mobility:     Supine to Sit: Supervision  Sit to Supine: Supervision  Scooting: Supervision  Transfers:  Sit to Stand: Supervision;Contact guard assistance  Stand to Sit: Supervision;Contact guard assistance  Stand Pivot Transfers: Supervision;Contact guard assistance                    Balance:   Sitting: Intact(pt baseline is rocking)  Standing: Impaired; Without support  Standing - Static: Fair(decreased but functional baseline)  Standing - Dynamic : Fair(decreased but functional baseline)  Ambulation/Gait Training:  Distance (ft): 20 Feet (ft)  Assistive Device: Gait belt;Brace/Splint  Ambulation - Level of Assistance: Contact guard assistance;Assist x1     Gait Description (WDL): Exceptions to WDL  Gait Abnormalities: Decreased step clearance; Antalgic; Shuffling gait  Right Side Weight Bearing: As tolerated     Base of Support: Widened;Center of gravity altered  Stance: Right decreased  Speed/Fadumo: Fluctuations; Pace decreased (<100 feet/min)  Step Length: Left shortened;Right shortened  Pt able to perform short distances but does not like it when therapy is close. Pt father is able to walk pt without large issue. Educated pt family on gait belt use     Functional Measure:  Not appropriate       Physical Therapy Evaluation Charge Determination   History Examination Presentation Decision-Making   HIGH Complexity :3+ comorbidities / personal factors will impact the outcome/ POC  HIGH Complexity : 4+ Standardized tests and measures addressing body structure, function, activity limitation and / or participation in recreation  MEDIUM Complexity : Evolving with changing characteristics  MEDIUM Complexity : FOTO score of 26-74      Based on the above components, the patient evaluation is determined to be of the following complexity level: MEDIUM    Pain:           Activity Tolerance:   Fair     After treatment:   []   Patient left in no apparent distress sitting up in chair  [x]   Patient left in no apparent distress in bed  [x]   Call bell left within reach  [x]   Nursing notified  [x]   Caregiver present  []   Bed alarm activated    COMMUNICATION/EDUCATION:   Communication/Collaboration:  [x]   Fall prevention education was provided and the patient/caregiver indicated understanding. []   Patient/family have participated as able and agree with findings and recommendations. []   Patient is unable to participate in plan of care at this time.   Findings and recommendations were discussed with: Registered Nurse, Physician,  and     Thank you for this referral.  Yaz Ashley, PT   Time Calculation: 17 min +82 mins for a total of 99 minutes

## 2019-03-22 NOTE — PROGRESS NOTES
S:   NAEO  Tolerating PO, low appetite  Did not sleep well, per family was sitting up in bed until 3AM  Voiding   Non-verbal    O:  Visit Vitals  /70 (BP 1 Location: Right arm, BP Patient Position: At rest)   Pulse 99   Temp 97.8 °F (36.6 °C)   Resp 20   Ht 5' 4\" (1.626 m)   Wt 62.1 kg (137 lb)   SpO2 95%   BMI 23.52 kg/m²     Intermittently restless in bed, appears to be sleeping, not verbal, not cooperative with requests  RRR  NLB on RA  LUE: dsg c/d/i, moving hand/arm without apparent deficit, WWP, strong rp  RLE: WWP, no pressure on cast edges    No results found for this or any previous visit (from the past 12 hour(s)).     A/P:  43 YOM with autism spectrum s/p ORIF left clavicle and casting right calcaneus fracture    POD#1     Restless in bed, unclear if pain related  Otherwise doing well without acute events  Family expresses concern about his facility being able to care for current needs, though not much has changed since prior to admission except restlessness    NWB LUE, RLE as best as can cooperate  Gen diet  Work with PT on transfers  Optimize pain regimen, emphasis on non-opioid options    Dispo: possibly back to facility today    -RUDI

## 2019-03-22 NOTE — OP NOTES
[address]   OPERATIVE REPORT    Name:  Laura Fisher  MR#:  [id number]  :  1980  ACCOUNT #:  [de-identified]  DATE OF SERVICE:  2019    PREOPERATIVE DIAGNOSES:  Left clavicle fracture and right calcaneus stress fracture. POSTOPERATIVE DIAGNOSES:  Left clavicle fracture and right calcaneus stress fracture. PROCEDURE PERFORMED:  Open reduction and internal fixation of left clavicle fracture and casting of right heel stress fracture. SURGEON:  Dino Brandt MD    ASSISTANT:  nobody    ANESTHESIA:  General anesthesia. COMPLICATIONS:  None. SPECIMENS REMOVED:  None. IMPLANTS:  An 8-hole clavicle plate. ESTIMATED BLOOD LOSS:  20 mL. FINDINGS:  A severely comminuted fracture of left clavicle and a nondisplaced fracture of the right calcaneus. PROCEDURE:  After satisfactory induction of general anesthesia, the patient was placed in a supine position on the operating table, prepped and draped in the usual sterile fashion. The left clavicle was approached with a dorsal incision. It was 10 cm in length. Dissection was carried down to the subcutaneous tissues and skin, down to the clavicle, was cleaned off soft tissue, approximated and an 8-hole Accumed plate was inserted. It was irrigated and found to be in satisfactory condition under biplanar fluoroscopic imaging and the patient had the wound closed in separate layers in the fascia, subcutaneous tissues, and skin. Sterile dressing was applied.         [address] SHARPS, MD      [id number]  D:  2019 11:23  T:  2019 13:49  JOB #:  [id number]  CC:  Dino Brandt MD

## 2019-03-22 NOTE — PROGRESS NOTES
Bedside and Verbal shift change report given to Reed Díaz RN (oncoming nurse) by Shelia Brewster RN and Nasima Blair RN (offgoing nurse). Report included the following information SBAR, Kardex, ED Summary, Procedure Summary, Intake/Output, MAR and Accordion.

## 2019-03-22 NOTE — DISCHARGE INSTRUCTIONS
Patient Education     Per Dr. Lambert Torres: Take colace or miralax twice a day as needed for constipation while taking pain medications. Follow over the counter instructions on medication. Broken Collarbone: Care Instructions  Your Care Instructions    You have broken or cracked your collarbone, or clavicle. The collarbone is the long, slightly curved bone that connects the shoulder to the chest. It supports the shoulder. A broken collarbone may take 6 weeks or longer to heal. You will need to wear an arm sling to keep the broken bone from moving while it heals. At first, it may hurt to move your arm. This will get better with time. You heal best when you take good care of yourself. Eat a variety of healthy foods, and don't smoke. Follow-up care is a key part of your treatment and safety. Be sure to make and go to all appointments, and call your doctor if you are having problems. It's also a good idea to know your test results and keep a list of the medicines you take. How can you care for yourself at home? · Wear the sling day and night for as long as your doctor tells you to. You may take off the sling when you bathe. When the sling is off, avoid arm positions or motions that cause or increase pain. · Put ice or a cold pack on your collarbone for 10 to 20 minutes at a time. Try to do this every 1 to 2 hours for the next 3 days (when you are awake) or until the swelling goes down. Put a thin cloth between the ice and your skin. · Be safe with medicines. Take pain medicines exactly as directed. ? If the doctor gave you a prescription medicine for pain, take it as prescribed. ? If you are not taking a prescription pain medicine, ask your doctor if you can take an over-the-counter medicine. ? Do not take two or more pain medicines at the same time unless the doctor told you to. Many pain medicines have acetaminophen, which is Tylenol. Too much acetaminophen (Tylenol) can be harmful.   · Try sleeping with pillows propped under your arm for comfort. · After a few days, put your fingers, wrist, and elbow through their full range of motion several times a day. This will keep them from getting stiff. You may get instructions on rehabilitation exercises you can do when your shoulder starts to heal.  · You may use warm packs after the first 3 days for 15 to 20 minutes at a time to ease pain. · You may notice a bump where the collarbone is broken. Over time, the bump will get smaller. A small bump may remain, but it should not affect your arm's strength or movement. When should you call for help? Call your doctor now or seek immediate medical care if:    · Your fingers become numb, tingly, cool, or pale.     · You cannot move your arm.    Watch closely for changes in your health, and be sure to contact your doctor if:    · You have new or increased pain.     · You have new or increased swelling.     · You do not get better as expected. Where can you learn more? Go to http://david-dwaine.info/. Enter P186 in the search box to learn more about \"Broken Collarbone: Care Instructions. \"  Current as of: September 20, 2018  Content Version: 11.9  © 7060-0521 [employer]. Care instructions adapted under license by [employer] (which disclaims liability or warranty for this information). If you have questions about a medical condition or this instruction, always ask your healthcare professional. [place] [address] any warranty or liability for your use of this information. Patient Education     Caring for Your Cast: After Your Visit  Your Care Instructions  A cast protects a broken bone or other injury. Most casts are made of fiberglass, but plaster casts are still sometimes used. Once a cast is on, you can't remove it yourself. Your doctor will take it off. Follow-up care is a key part of your treatment and safety.  Be sure to make and go to all appointments, and call your doctor if you are having problems. It's also a good idea to know your test results and keep a list of the medicines you take. How can you care for yourself at home? General care  · Follow your doctor's instructions for when you can first put weight on the cast. Fiberglass casts dry quickly and are soon ready to bear weight. But plaster casts may take several days before they are hard enough to use. When it's okay to put weight on your cast, do not stand or walk on it unless it is designed for walking. · Prop up the injured arm or leg on a pillow anytime you sit or lie down during the next 3 days. Try to keep it above the level of your heart. This will help reduce swelling. · If the fingers or toes on the limb with the cast were not injured, wiggle them every now and then. This helps move the blood and fluids in the injured limb. · Be safe with medicines. Read and follow all instructions on the label. ¨ If the doctor gave you a prescription medicine for pain, take it as prescribed. ¨ If you are not taking a prescription pain medicine, ask your doctor if you can take an over-the-counter medicine. · Keep up your muscle strength and tone as much as you can while protecting your injured limb or joint. Your doctor may want you to tense and relax the muscles protected by the cast. Check with your doctor or physical therapist for instructions. Water and your cast  · Do not get your cast wet unless you have a fiberglass cast with a quick-drying lining. · Keep your cast covered with at least two layers of plastic when you take a shower or bath or when you have any other contact with water. Moisture can collect under the cast and cause skin irritation and itching. It can make infection more likely if you have had surgery or have a wound under the cast.  · If you have a fiberglass cast with a fast-drying lining, make sure to rinse it with fresh water after you swim.  It will take about an hour for the lining to dry. Cast and skin care  · Try blowing cool air from a hair dryer or fan into the cast to help relieve itching. Never stick items under your cast to scratch the skin. · Don't use oils or lotions near your cast. If the skin gets red or irritated around the edge of the cast, you may pad the edges with a soft material or use tape to cover them. · Watch for pressure sores. These can develop over bony areas. Symptoms include a warm spot under the cast, pain, drainage, or an odor. Call your doctor if you think you have a pressure sore. · Watch for compartment syndrome. This happens when pressure builds up in a group of muscles, nerves, and blood vessels. It is an emergency. Symptoms include severe pain or tingling or numbness. When should you call for help? Call your doctor now or seek immediate medical care if:  · You have increased or severe pain. · You feel a warm or painful spot under the cast.  · You have problems with your cast. For example:  ¨ The skin under the cast burns or stings. ¨ The cast feels too tight. ¨ There is a lot of swelling near the cast. (Some swelling is normal.)  ¨ You have a new fever. ¨ There is drainage or a bad smell coming from the cast.  · Your foot or hand is cool or pale or changes color. · You have trouble moving your fingers or toes. · You have symptoms of a blood clot in your arm or leg (called a deep vein thrombosis). These may include:  ¨ Pain in the arm, calf, back of the knee, thigh, or groin. ¨ Redness and swelling in the arm, leg, or groin. Watch closely for changes in your health, and be sure to contact your doctor if:  · The cast is breaking apart. · You are not getting better as expected. Where can you learn more? Go to [employer]  Enter P318 in the search box to learn more about \"Caring for Your Cast: After Your Visit. \"   © 1815-4212 [employer].  Care instructions adapted under license by [facility] (which disclaims liability or warranty for this information). This care instruction is for use with your licensed healthcare professional. If you have questions about a medical condition or this instruction, always ask your healthcare professional. [address] any warranty or liability for your use of this information.   Content Version: 38.4.788555; Current as of: June 4, 2014

## 2019-03-22 NOTE — PROGRESS NOTES
Pt seen for evaluation-- full note to follow. Pt written as NWB RLE/avoid WB RLE however verbal order received for WBAT RLE, informed RN to change WB status. Pt will DC today to group home, will be training group home staff in PM session.

## 2019-03-22 NOTE — PROGRESS NOTES
I have reviewed discharge instructions with the parent and caregiver. The parent and caregiver verbalized understanding.

## 2019-03-22 NOTE — DISCHARGE SUMMARY
Physician Discharge Summary     Patient ID:  Valentina Marin  [id number]  43 y.o.  1980    Allergies: Gluten    Admit Date: 3/21/2019    Discharge Date: 3/22/2019    * Admission Diagnoses: H/O fracture of clavicle [Z87.81]    * Discharge Diagnoses:    Hospital Problems as of 3/22/2019 Date Reviewed: 3/21/2019          Codes Class Noted - Resolved POA    Autism spectrum disorder ICD-10-CM: F84.0  ICD-9-CM: 299.00  3/21/2019 - Present Unknown        Closed displaced fracture of left clavicle ICD-10-CM: S42.002A  ICD-9-CM: 810.00  3/21/2019 - Present Unknown        H/O fracture of clavicle ICD-10-CM: Z87.81  ICD-9-CM: V15.51  3/21/2019 - Present Unknown               Admission Condition: Good    * Discharge Condition: good    * Procedures: Procedure(s):  OPEN REDUCTION INTERNAL FIXATION LEFT CLAVICLE FRACTURE    * Hospital Course:   Normal hospital course for this procedure. Consults: None    Significant Diagnostic Studies: None    * Disposition: Return to group home    Discharge Medications:   Current Discharge Medication List      CONTINUE these medications which have CHANGED    Details   HYDROcodone-acetaminophen (NORCO) 5-325 mg per tablet Take 1 Tab by mouth every four (4) hours as needed for Pain for up to 7 days. Max Daily Amount: 6 Tabs. Qty: 50 Tab, Refills: 0    Associated Diagnoses: Closed displaced fracture of shaft of left clavicle with routine healing, subsequent encounter         CONTINUE these medications which have NOT CHANGED    Details   omeprazole (PRILOSEC) 40 mg capsule Take 40 mg by mouth daily. !! carBAMazepine XR (TEGRETOL XR) 100 mg SR tablet Take  by mouth two (2) times a day. atorvastatin (LIPITOR) 40 mg tablet Take  by mouth daily. !! carBAMazepine XR (TEGRETOL XR) 400 mg SR tablet Take 400 mg by mouth two (2) times a day. cod liver oil cap Take 2 Caps by mouth two (2) times a day. zinc 50 mg tab tablet Take  by mouth daily.       FLUoxetine (PROZAC) 20 mg capsule Take 30 mg by mouth daily. cetirizine (ZYRTEC) 10 mg tablet Take  by mouth daily. calcium polycarbophil (FIBERCON) 625 mg tablet Take 625 mg by mouth every evening. melatonin 5 mg cap capsule Take 5 mg by mouth nightly. Bifidobacterium Infantis (ALIGN) 4 mg cap Take 1 capsule by mouth daily. magnesium oxide (MAG-OX) 400 mg tablet Take 400 mg by mouth daily. Calcium-Cholecalciferol, D3, (CALCIUM 600 WITH VITAMIN D3) 600 mg(1,500mg) -400 unit cap Take 1 tablet by mouth daily. multivitamin (ONE A DAY) tablet Take 1 tablet by mouth daily. swtx-taqxpkn-mdd-hydrocort (CORTISPORIN) 3.5-400-10,000 mg-unit/g-1% ophthalmic ointment Apply to each eye 3 times/ day  Qty: 1 Tube, Refills: 0       !! - Potential duplicate medications found. Please discuss with provider. * Follow-up Care/Patient Instructions:   Activity: Avoid weightbearing on left arm and right foot  Diet: Regular Diet  Wound Care: Keep wound clean and dry, Reinforce dressing PRN and Ice to area for comfort    Follow-up Information     Follow up With Specialties Details Why Contact Info    Mata [address]  [phone]          Follow-up tests/labs None    Signed:  Ruby Soliman MD  3/22/2019  6:18 AM

## 2021-09-15 ENCOUNTER — HOSPITAL ENCOUNTER (OUTPATIENT)
Dept: WOUND CARE | Age: 41
Discharge: HOME | End: 2021-09-15
Payer: MEDICARE

## 2021-09-15 VITALS
TEMPERATURE: 97.8 F | HEART RATE: 85 BPM | DIASTOLIC BLOOD PRESSURE: 89 MMHG | SYSTOLIC BLOOD PRESSURE: 120 MMHG | RESPIRATION RATE: 16 BRPM

## 2021-09-15 PROBLEM — S61.102S: Status: ACTIVE | Noted: 2021-09-15

## 2021-09-15 PROCEDURE — 99214 OFFICE O/P EST MOD 30 MIN: CPT

## 2021-09-15 RX ORDER — SIMETHICONE 80 MG
80 TABLET,CHEWABLE ORAL
COMMUNITY

## 2021-09-15 RX ORDER — SODIUM CHLORIDE 0.65 %
1 AEROSOL, SPRAY (ML) NASAL
COMMUNITY

## 2021-09-15 RX ORDER — GUAIFENESIN/DEXTROMETHORPHAN 100-10MG/5
10 SYRUP ORAL
COMMUNITY

## 2021-09-15 RX ORDER — DIPHENHYDRAMINE HCL 25 MG
25 CAPSULE ORAL
COMMUNITY

## 2021-09-15 RX ORDER — CYANOCOBALAMIN (VITAMIN B-12) 1000 MCG
2 TABLET, EXTENDED RELEASE ORAL 2 TIMES DAILY
COMMUNITY

## 2021-09-15 RX ORDER — GUAIFENESIN 600 MG/1
600 TABLET, EXTENDED RELEASE ORAL 2 TIMES DAILY
COMMUNITY

## 2021-09-15 RX ORDER — HYDROCORTISONE 1 %
CREAM (GRAM) TOPICAL 2 TIMES DAILY
COMMUNITY

## 2021-09-15 RX ORDER — PHENOL 1.4 G/100ML
3 SPRAY ORAL AS NEEDED
COMMUNITY

## 2021-09-15 RX ORDER — MAG HYDROX/ALUMINUM HYD/SIMETH 200-200-20
30 SUSPENSION, ORAL (FINAL DOSE FORM) ORAL
COMMUNITY

## 2021-09-15 NOTE — WOUND CARE
09/15/21 0950   Wound Finger (Comment which one) Left Left Thumb 09/15/21   Date First Assessed/Time First Assessed: 09/15/21 0920   Wound Approximate Age at First Assessment (Weeks): 2 weeks  Location: (c) Finger (Comment which one)  Wound Location Orientation: Left  Wound Description: Left Thumb  Date of First Observation: . .. Wound Image    Dressing Status Intact; Other (Comment)  (Removed nonadherent dsg,roll gauze, coban, protective mitt.)   Cleansed Cleansed with saline   Wound Length (cm) 0.1 cm   Wound Width (cm) 0.1 cm   Wound Depth (cm) 0.1 cm   Wound Surface Area (cm^2) 0.01 cm^2   Wound Volume (cm^3) 0.001 cm^3   Wound Assessment Epithelialization; Purple/maroon; Other (Comment)  (Ecchymotic wAreas of crusted skin, No open wound bed visible)   Drainage Amount None   Wound Odor None   Anna-Wound/Incision Assessment Maceration;Fragile     Visit Vitals  /89 (BP 1 Location: Right upper arm, BP Patient Position: Standing)   Pulse 85   Temp 97.8 °F (36.6 °C)   Resp 16

## 2021-09-15 NOTE — DISCHARGE INSTRUCTIONS
Discharge Instructions for  [facility]  [address]  MOB 1, [address] [place], [place]  Telephone: [phone]    NAME:  Sohpie Perez  YOB: 1980  MEDICAL RECORD NUMBER:  [id number]  DATE:  9/15/2021     WOUND CARE ORDERS:Left Thumb-Cleanse w/Normal saline & gauze or mild soap and water, pat dry. Cover thumb with Silver Alginate, Secure w/roll gauze, light layer of coban and protective mitt. Dressing to be changed in 3 days. Return to clinic in 1 week for follow up w/Dr. Jesus Sorenson. TREATMENT ORDERS:   Keep left thumb covered at all times  Do not get dressing/wrap wet. Follow Diet as prescribed:   [x] Diet as tolerated: Limit/Avoid Gluten            Return Appointment:  [x] Return Appointment: With DR Jesus Sorenson  in [facility])  [] Nurse Visit : *** days  [] Ordered tests:    Electronically signed Morgan Paiz RN on 9/15/2021 at 10:06 AM     [facility] Information: Should you experience any significant changes in your wound(s) or have questions about your wound care, please contact the [facility] at [address] [place] [address] 8:00 am - 4:30. If you need help with your wound outside these hours and cannot wait until we are again available, contact your PCP or go to the hospital emergency room. PLEASE NOTE: IF YOU ARE UNABLE TO OBTAIN WOUND SUPPLIES, CONTINUE TO USE THE SUPPLIES YOU HAVE AVAILABLE UNTIL YOU ARE ABLE TO REACH US. IT IS MOST IMPORTANT TO KEEP THE WOUND COVERED AT ALL TIMES.      Physician Signature:_______________________    Date: ___________ Time:  ____________

## 2021-09-15 NOTE — H&P
Wound Center  History and Physical    Date of Service: 9/15/21     Date of Initial Visit for Current Problem:  9/15/21    Subjective:     Chief Complaint:  Becky Mei is a 42 y.o.  male who presents with left thumb wound of 2-3 weeks duration. Referred by:  Dr. Ashley Schultz    HPI:   Nonverbal autistic male in care facility wrapped a string around his thumb, and it started to blister and necrose. He went to the ER, where they didn't help much. Care Amina Hall was called, they gave them a mitt and dressing changes. He is here for follow up    Appetite: good  Wound associated pain: none  Diabetic: No  Smoker: No    Past Medical History:   Diagnosis Date    GERD (gastroesophageal reflux disease)     Hyperlipidemia     Ill-defined condition     Autism  Spectrum Disorder    Incontinence     URINE, STOOL; WEARS DEPENDS.  Neurological disorder     autism; NONVERBAL    PUD (peptic ulcer disease)     Seizures ([address]     CONTROLLED WITH MEDS;  LAST SZ:  EARLY TEENS    Stress fracture     RIGHT HEEL; WEARS BOOT      Past Surgical History:   Procedure Laterality Date    HX ENDOSCOPY      HX ORTHOPAEDIC Right 1992    collar bone surgery, METAL  (DR CONTRERAS)    HX [address] Left     Mother unsure of date     Family History   Problem Relation Age of Onset    Hypertension Mother     Heart Disease Father         CABG    Hypertension Father    [facility] Anxiety Father     Anesth Problems Neg Hx       Social History     Tobacco Use    Smoking status: Never Smoker    Smokeless tobacco: Never Used   Substance Use Topics    Alcohol use: No       Prior to Admission medications    Medication Sig Start Date End Date Taking? Authorizing Provider   omeprazole (PRILOSEC) 40 mg capsule Take 40 mg by mouth daily. Provider, Historical   carBAMazepine XR (TEGRETOL XR) 100 mg SR tablet Take  by mouth two (2) times a day. Provider, Historical   atorvastatin (LIPITOR) 40 mg tablet Take  by mouth daily.     Provider, Historical carBAMazepine XR (TEGRETOL XR) 400 mg SR tablet Take 400 mg by mouth two (2) times a day. Provider, Historical   cod liver oil cap Take 2 Caps by mouth two (2) times a day. Provider, Historical   zinc 50 mg tab tablet Take  by mouth daily. Provider, Historical   FLUoxetine (PROZAC) 20 mg capsule Take 30 mg by mouth daily. Provider, Historical   cetirizine (ZYRTEC) 10 mg tablet Take  by mouth daily. Provider, Historical   calcium polycarbophil (FIBERCON) 625 mg tablet Take 625 mg by mouth every evening. Provider, Historical   melatonin 5 mg cap capsule Take 5 mg by mouth nightly. Provider, Historical   jpyy-dgskvet-ars-hydrocort (CORTISPORIN) 3.5-400-10,000 mg-unit/g-1% ophthalmic ointment Apply to each eye 3 times/ day 10/14/15   Rachell Krueger MD   Bifidobacterium Infantis (ALIGN) 4 mg cap Take 1 capsule by mouth daily. Winston Jason MD   magnesium oxide (MAG-OX) 400 mg tablet Take 400 mg by mouth daily. Winston Jason MD   Calcium-Cholecalciferol, D3, (CALCIUM 600 WITH VITAMIN D3) 600 mg(1,500mg) -400 unit cap Take 1 tablet by mouth daily. Winston Jason MD   multivitamin (ONE A DAY) tablet Take 1 tablet by mouth daily. Winston Jason MD     Allergies   Allergen Reactions    Gluten Rash and Other (comments)     Wheat intolerance - BEHAVORIAL ISSUES        Review of Systems:  A comprehensive review of systems was negative except for that written in the History of Present Illness. and PMH. Objective:     Physical Exam:     Visit Vitals  /89 (BP 1 Location: Right upper arm, BP Patient Position: Standing)   Pulse 85   Temp 97.8 °F (36.6 °C)   Resp 16     General: well developed, well nourished, pleasant , NAD. Hygiene good  Psych: cooperative. Pleasant. No anxiety or depression. Normal mood and affect. Neuro: alert and oriented to person/place/situation. Otherwise nonfocal.  HEENT: Normocephalic, atraumatic. EOMI. Conjunctiva clear. No scleral icterus.    Chest: Respirations nonlabored. Abdomen:  Nondistended. Ulcer Location: Left thumb   Etiology: Ischemic injury combined with thumb sucking. Wound: No open wound,  But thin skin. Assessment:     42 y.o. male with healing wound left thumb. Plan:     Dressing: Aquacell Ag, DSD, protective glove. Frequency: once or twice weekly. Patient understood and agrees with plan. Questions answered. Weekly visits and serial debridements also discussed.   Follow up with me in 1 week    Signed By: Felix Padgett MD     September 15, 2021

## 2021-09-17 RX ORDER — PSEUDOEPH/DM/GUAIFEN/ACETAMIN 30-10-324
1 EXPECTORANT ORAL
COMMUNITY

## 2021-09-17 RX ORDER — BISMUTH SUBSALICYLATE 262 MG/15ML
30 LIQUID ORAL
COMMUNITY

## 2021-09-17 RX ORDER — PSEUDOEPHEDRINE HCL 30 MG
30 TABLET ORAL
COMMUNITY

## 2021-09-22 ENCOUNTER — HOSPITAL ENCOUNTER (OUTPATIENT)
Dept: WOUND CARE | Age: 41
Discharge: HOME | End: 2021-09-22
Payer: MEDICARE

## 2021-09-22 VITALS
DIASTOLIC BLOOD PRESSURE: 80 MMHG | SYSTOLIC BLOOD PRESSURE: 120 MMHG | TEMPERATURE: 97.5 F | HEART RATE: 77 BPM | RESPIRATION RATE: 16 BRPM

## 2021-09-22 PROCEDURE — 99213 OFFICE O/P EST LOW 20 MIN: CPT

## 2021-09-22 NOTE — PROGRESS NOTES
Wound Center  Progress Note     Date of Service: 9/22/21      Date of Initial Visit for Current Problem:  9/15/21     Subjective:      Chief Complaint:  Deon Elder is a 42 y.o.  male who presents with left thumb wound of 2-3 weeks duration. Referred by:  Dr. Juan Pradhan     HPI:   Nonverbal autistic male in care facility wrapped a string around his thumb, and it started to blister and necrose. He went to the ER, where they didn't help much. Care Lachelle Appiah was called, they gave them a mitt and dressing changes. He is here for follow up     Appetite: good  Wound associated pain: none  Diabetic: No  Smoker: No          Past Medical History:   Diagnosis Date    GERD (gastroesophageal reflux disease)      Hyperlipidemia      Ill-defined condition       Autism  Spectrum Disorder    Incontinence       URINE, STOOL; WEARS DEPENDS.  Neurological disorder       autism; NONVERBAL    PUD (peptic ulcer disease)      Seizures ([address]       CONTROLLED WITH MEDS;  LAST SZ:  EARLY TEENS    Stress fracture       RIGHT HEEL; WEARS BOOT            Past Surgical History:   Procedure Laterality Date    HX ENDOSCOPY        HX ORTHOPAEDIC Right 1992     collar bone surgery, METAL  (DR COTNRERAS)    HX ORTHOPAEDIC Left       Mother unsure of date            Family History   Problem Relation Age of Onset    Hypertension Mother      Heart Disease Father           CABG    Hypertension Father     [employer] Anxiety Father      Anesth Problems Neg Hx        Social History           Tobacco Use    Smoking status: Never Smoker    Smokeless tobacco: Never Used   Substance Use Topics    Alcohol use: No               Prior to Admission medications    Medication Sig Start Date End Date Taking? Authorizing Provider   omeprazole (PRILOSEC) 40 mg capsule Take 40 mg by mouth daily. Provider, Historical   carBAMazepine XR (TEGRETOL XR) 100 mg SR tablet Take  by mouth two (2) times a day.        Provider, Historical   atorvastatin (LIPITOR) 40 mg tablet Take  by mouth daily. Provider, Historical   carBAMazepine XR (TEGRETOL XR) 400 mg SR tablet Take 400 mg by mouth two (2) times a day. Provider, Historical   cod liver oil cap Take 2 Caps by mouth two (2) times a day. Provider, Historical   zinc 50 mg tab tablet Take  by mouth daily. Provider, Historical   FLUoxetine (PROZAC) 20 mg capsule Take 30 mg by mouth daily. Provider, Historical   cetirizine (ZYRTEC) 10 mg tablet Take  by mouth daily. Provider, Historical   calcium polycarbophil (FIBERCON) 625 mg tablet Take 625 mg by mouth every evening. Provider, Historical   melatonin 5 mg cap capsule Take 5 mg by mouth nightly. Provider, Historical   qhba-toomeqw-mhe-hydrocort (CORTISPORIN) 3.5-400-10,000 mg-unit/g-1% ophthalmic ointment Apply to each eye 3 times/ day 10/14/15     Gonzalo Baez MD   Bifidobacterium Infantis (ALIGN) 4 mg cap Take 1 capsule by mouth daily. Winston Jason MD   magnesium oxide (MAG-OX) 400 mg tablet Take 400 mg by mouth daily. Winston Jason MD   Calcium-Cholecalciferol, D3, (CALCIUM 600 WITH VITAMIN D3) 600 mg(1,500mg) -400 unit cap Take 1 tablet by mouth daily. Winston Jason MD   multivitamin (ONE A DAY) tablet Take 1 tablet by mouth daily. Winston Jason MD      Allergies   Allergen Reactions    Gluten Rash and Other (comments)       Wheat intolerance - BEHAVORIAL ISSUES         Review of Systems:  A comprehensive review of systems was negative except for that written in the History of Present Illness. and PMH. Objective:      Physical Exam:      Visit Vitals  /89 (BP 1 Location: Right upper arm, BP Patient Position: Standing)   Pulse 85   Temp 97.8 °F (36.6 °C)   Resp 16      General: well developed, well nourished, pleasant , NAD. Hygiene good  Psych: cooperative. Pleasant. No anxiety or depression. Normal mood and affect. Neuro: alert and oriented to person/place/situation.  Otherwise nonfocal.  HEENT: Normocephalic, atraumatic. EOMI. Conjunctiva clear. No scleral icterus. Chest: Respirations nonlabored. Abdomen:  Nondistended. Ulcer Location: Left thumb   Etiology: Ischemic injury combined with thumb sucking. Wound: No open wound,  But thin skin. Looks better today. Assessment:      42 y.o. male with healed wound left thumb. Plan:      Dressing: Aquacell Ag, DSD, protective glove. Frequency: once or twice weekly. Use glove for another 7-10 days. Guardian understood and agrees with plan. Questions answered. Follow up as needed.   Signed By: Yousif Martinez MD

## 2021-09-22 NOTE — DISCHARGE INSTRUCTIONS
Discharge Instructions/Wound Orders  [address], [address] [facility] [place]  [facility], [id number]  Telephone: [phone]    NAME:  Rosemary Perez  YOB: 1980  MEDICAL RECORD NUMBER:  [id number]  DATE:  9/22/2021  Wound Care Orders:  Left Thumb-Cleanse w/Normal saline & gauze or mild soap and water, pat dry. apply vaseline Cover thumb with   gauze, Secure w/roll gauze, light layer of coban and protective juan manuel. Dressing to be changed 2 week days. Return to clinic as needed. Juan Manuel and dressing can be removed once wound is healed. Dietary:  [x] Diet as tolerated: [] Calorie Diabetic Diet:Low carb and no Sugar [] No Added Salt:[] Increase Protein: [] Other:Limit the amount of liquid you are drinking and avoid drinking in between meals   Activity:  [x] Activity as tolerated:  [] Patient has no activity restrictions     [] Strict Bedrest: [] Remain off Work:     [] May return to full duty work:                                   [] Return to work with restrictions:             Return Appointment:   [x] Return Appointment: With DR Gwinda Opitz  in  As needed  [] Ordered tests:    Electronically signed Shawna Dia RN on 9/22/2021 at 10:57 AM     [address]: Should you experience any significant changes in your wound(s) or have questions about your wound care, please contact the [address] [place] [address] at [address] [place] [address] 8:00 am - 4:30. If you need help with your wound outside these hours and cannot wait until we are again available, contact your PCP or go to the hospital emergency room. PLEASE NOTE: IF YOU ARE UNABLE TO OBTAIN WOUND SUPPLIES, CONTINUE TO USE THE SUPPLIES YOU HAVE AVAILABLE UNTIL YOU ARE ABLE TO REACH US. IT IS MOST IMPORTANT TO KEEP THE WOUND COVERED AT ALL TIMES.      Physician Signature:_______________________    Date: ___________ Time:  ____________

## 2021-09-22 NOTE — WOUND CARE
09/22/21 1054   Wound Finger (Comment which one) Left Left Thumb 09/15/21   Date First Assessed/Time First Assessed: 09/15/21 0920   Wound Approximate Age at First Assessment (Weeks): 2 weeks  Location: (c) Finger (Comment which one)  Wound Location Orientation: Left  Wound Description: Left Thumb  Date of First Observation: . .. Wound Image    Dressing Status New dressing applied   Cleansed Cleansed with saline; Soap and water   Dressing/Treatment Gauze dressing/dressing sponge;Roll gauze;Coban/self-adherent bandages  (mitt on)   Wound Length (cm) 0.1 cm   Wound Width (cm) 0.1 cm   Wound Depth (cm) 0.1 cm   Wound Surface Area (cm^2) 0.01 cm^2   Change in Wound Size % 0   Wound Volume (cm^3) 0.001 cm^3   Wound Healing % 0   Wound Assessment   (scab)   Drainage Amount None   Wound Odor None   Anna-Wound/Incision Assessment Fragile   Pain 1   Pain Scale 1 Adult Nonverbal Pain Scale     Visit Vitals  /80   Pulse 77   Temp 97.5 °F (36.4 °C)   Resp 16

## 2022-03-18 PROBLEM — S42.002A CLOSED DISPLACED FRACTURE OF LEFT CLAVICLE: Status: ACTIVE | Noted: 2019-03-21

## 2022-03-19 PROBLEM — S61.102S: Status: ACTIVE | Noted: 2021-09-15

## 2022-03-19 PROBLEM — Z87.81: Status: ACTIVE | Noted: 2019-03-21

## 2022-03-19 PROBLEM — F84.0 AUTISM SPECTRUM DISORDER: Status: ACTIVE | Noted: 2019-03-21

## 2023-05-18 RX ORDER — FLUOXETINE HYDROCHLORIDE 40 MG/1
40 CAPSULE ORAL DAILY
COMMUNITY

## 2023-05-18 RX ORDER — MAGNESIUM HYDROXIDE/ALUMINUM HYDROXICE/SIMETHICONE 120; 1200; 1200 MG/30ML; MG/30ML; MG/30ML
30 SUSPENSION ORAL EVERY 4 HOURS PRN
COMMUNITY

## 2023-05-18 RX ORDER — NEOMYCIN SULFATE, POLYMYXIN B SULFATE, BACITRACIN ZINC, HYDROCORTISONE 3.5; 10000; 400; 1 MG/G; [USP'U]/G; [USP'U]/G; MG/G
OINTMENT OPHTHALMIC
COMMUNITY
Start: 2015-10-14

## 2023-05-18 RX ORDER — ALUMINUM ZIRCONIUM OCTACHLOROHYDREX GLY 16 G/100G
1 GEL TOPICAL DAILY
COMMUNITY

## 2023-05-18 RX ORDER — CETIRIZINE HYDROCHLORIDE 10 MG/1
TABLET ORAL DAILY
COMMUNITY

## 2023-05-18 RX ORDER — DIAPER,BRIEF,INFANT-TODD,DISP
EACH MISCELLANEOUS 2 TIMES DAILY
COMMUNITY

## 2023-05-18 RX ORDER — GUAIFENESIN 600 MG/1
600 TABLET, EXTENDED RELEASE ORAL 2 TIMES DAILY
COMMUNITY

## 2023-05-18 RX ORDER — CALCIUM POLYCARBOPHIL 625 MG
625 TABLET ORAL EVERY EVENING
COMMUNITY

## 2023-05-18 RX ORDER — MAGNESIUM OXIDE 400 MG/1
400 TABLET ORAL DAILY
COMMUNITY

## 2023-05-18 RX ORDER — OMEPRAZOLE 40 MG/1
40 CAPSULE, DELAYED RELEASE ORAL DAILY
COMMUNITY

## 2023-05-18 RX ORDER — DIPHENHYDRAMINE HCL 25 MG
25 CAPSULE ORAL EVERY 6 HOURS PRN
COMMUNITY

## 2023-05-18 RX ORDER — ECHINACEA PURPUREA EXTRACT 125 MG
1 TABLET ORAL
COMMUNITY

## 2023-05-18 RX ORDER — CARBAMAZEPINE 400 MG/1
400 TABLET, EXTENDED RELEASE ORAL 2 TIMES DAILY
COMMUNITY

## 2023-05-18 RX ORDER — GUAIFENESIN DEXTROMETHORPHAN HYDROBROMIDE ORAL SOLUTION 10; 100 MG/5ML; MG/5ML
10 SOLUTION ORAL EVERY 6 HOURS PRN
COMMUNITY

## 2023-05-18 RX ORDER — PSEUDOEPHEDRINE HCL 30 MG
30 TABLET ORAL EVERY 6 HOURS PRN
COMMUNITY

## 2023-05-18 RX ORDER — ATORVASTATIN CALCIUM 40 MG/1
TABLET, FILM COATED ORAL DAILY
COMMUNITY

## 2023-05-18 RX ORDER — SIMETHICONE 80 MG
80 TABLET,CHEWABLE ORAL 4 TIMES DAILY PRN
COMMUNITY

## (undated) DEVICE — 3M™ STERI-DRAPE™ U-DRAPE 1015: Brand: STERI-DRAPE™

## (undated) DEVICE — INTENDED FOR TISSUE SEPARATION, AND OTHER PROCEDURES THAT REQUIRE A SHARP SURGICAL BLADE TO PUNCTURE OR CUT.: Brand: BARD-PARKER ® CARBON RIB-BACK BLADES

## (undated) DEVICE — INFECTION CONTROL KIT SYS

## (undated) DEVICE — DEVON™ KNEE AND BODY STRAP 60" X 3" (1.5 M X 7.6 CM): Brand: DEVON

## (undated) DEVICE — 2.8MM QUICK RELEASE DRILL: Brand: ACUMED

## (undated) DEVICE — STRIP,CLOSURE,WOUND,MEDI-STRIP,1/2X4: Brand: MEDLINE

## (undated) DEVICE — TOWEL SURG W17XL27IN STD BLU COT NONFENESTRATED PREWASHED

## (undated) DEVICE — SUTURE VCRL SZ 2-0 L27IN ABSRB UD L26MM SH 1/2 CIR J417H

## (undated) DEVICE — SOLUTION IV 1000ML 0.9% SOD CHL

## (undated) DEVICE — ARGYLE FRAZIER SURGICAL SUCTION INSTRUMENT 10 FR/CH (3.3 MM): Brand: ARGYLE

## (undated) DEVICE — SUTURE VCRL SZ 4-0 L18IN ABSRB UD L19MM PC-5 3/8 CIR J823G

## (undated) DEVICE — DRAPE XR C ARM 41X74IN LF --

## (undated) DEVICE — (D)PREP SKN CHLRAPRP APPL 26ML -- CONVERT TO ITEM 371833

## (undated) DEVICE — REM POLYHESIVE ADULT PATIENT RETURN ELECTRODE: Brand: VALLEYLAB

## (undated) DEVICE — ROCKER SWITCH PENCIL BLADE ELECTRODE, HOLSTER: Brand: EDGE

## (undated) DEVICE — STERILE POLYISOPRENE POWDER-FREE SURGICAL GLOVES: Brand: PROTEXIS

## (undated) DEVICE — SURGICAL PROCEDURE PACK BASIN MAJ SET CUST NO CAUT

## (undated) DEVICE — BANDAGE COMPR SELF ADH 5 YDX4 IN TAN STRL PREMIERPRO LF

## (undated) DEVICE — PACK,ORTHOPEDIC III,AURORA: Brand: MEDLINE

## (undated) DEVICE — CURITY NON-ADHERENT STRIPS: Brand: CURITY

## (undated) DEVICE — GAUZE SPONGES,12 PLY: Brand: CURITY